# Patient Record
Sex: MALE | Race: WHITE | NOT HISPANIC OR LATINO | URBAN - METROPOLITAN AREA
[De-identification: names, ages, dates, MRNs, and addresses within clinical notes are randomized per-mention and may not be internally consistent; named-entity substitution may affect disease eponyms.]

---

## 2022-06-30 ENCOUNTER — EMERGENCY (EMERGENCY)
Facility: HOSPITAL | Age: 32
LOS: 1 days | Discharge: HOME | End: 2022-06-30
Attending: EMERGENCY MEDICINE
Payer: SELF-PAY

## 2022-06-30 VITALS
SYSTOLIC BLOOD PRESSURE: 119 MMHG | RESPIRATION RATE: 18 BRPM | TEMPERATURE: 98 F | WEIGHT: 149.91 LBS | HEART RATE: 86 BPM | DIASTOLIC BLOOD PRESSURE: 82 MMHG | OXYGEN SATURATION: 100 %

## 2022-06-30 PROCEDURE — 73610 X-RAY EXAM OF ANKLE: CPT

## 2022-06-30 PROCEDURE — 90715 TDAP VACCINE 7 YRS/> IM: CPT

## 2022-06-30 PROCEDURE — 73110 X-RAY EXAM OF WRIST: CPT

## 2022-06-30 PROCEDURE — 73090 X-RAY EXAM OF FOREARM: CPT

## 2022-06-30 PROCEDURE — 73630 X-RAY EXAM OF FOOT: CPT | Mod: 26,50

## 2022-06-30 PROCEDURE — 11042 DBRDMT SUBQ TIS 1ST 20SQCM/<: CPT

## 2022-06-30 PROCEDURE — 73630 X-RAY EXAM OF FOOT: CPT

## 2022-06-30 PROCEDURE — 73610 X-RAY EXAM OF ANKLE: CPT | Mod: 26,50

## 2022-06-30 PROCEDURE — 73110 X-RAY EXAM OF WRIST: CPT | Mod: 26,RT

## 2022-06-30 PROCEDURE — 99284 EMERGENCY DEPT VISIT MOD MDM: CPT | Mod: 25

## 2022-06-30 PROCEDURE — 99284 EMERGENCY DEPT VISIT MOD MDM: CPT

## 2022-06-30 PROCEDURE — 73090 X-RAY EXAM OF FOREARM: CPT | Mod: 26,LT

## 2022-06-30 RX ORDER — CEPHALEXIN 500 MG
1 CAPSULE ORAL
Qty: 28 | Refills: 0
Start: 2022-06-30 | End: 2022-07-06

## 2022-06-30 RX ORDER — TETANUS TOXOID, REDUCED DIPHTHERIA TOXOID AND ACELLULAR PERTUSSIS VACCINE, ADSORBED 5; 2.5; 8; 8; 2.5 [IU]/.5ML; [IU]/.5ML; UG/.5ML; UG/.5ML; UG/.5ML
0.5 SUSPENSION INTRAMUSCULAR ONCE
Refills: 0 | Status: COMPLETED | OUTPATIENT
Start: 2022-06-30 | End: 2022-06-30

## 2022-06-30 RX ORDER — ACETAMINOPHEN 500 MG
650 TABLET ORAL ONCE
Refills: 0 | Status: COMPLETED | OUTPATIENT
Start: 2022-06-30 | End: 2022-06-30

## 2022-06-30 RX ADMIN — TETANUS TOXOID, REDUCED DIPHTHERIA TOXOID AND ACELLULAR PERTUSSIS VACCINE, ADSORBED 0.5 MILLILITER(S): 5; 2.5; 8; 8; 2.5 SUSPENSION INTRAMUSCULAR at 20:23

## 2022-06-30 RX ADMIN — Medication 650 MILLIGRAM(S): at 18:51

## 2022-06-30 NOTE — ED PROVIDER NOTE - PHYSICAL EXAMINATION
open wound to the right lateral malleolus  ecchymosis over the 3rd-5th left toes  ttp over the left lateral malleolus  full ROM of ankles b/l  large abrasion to the right forearm on the dorsal surface  abrasion, scabs on the right hip  abrasion above the open wound on the right leg Gen: No acute distress, non toxic. Pt sitting comfortably in the bed, on his phone.  HEAD: NCAT. No scalp lacerations or hematomas.   EYES: Mucous membranes moist, pink conjunctivae, EOMI  EARS: TM's gray, intact with good light reflex b/l. TM's with no erythema, exudate, effusion or bulging b/l. No hemotympanum b/l. No postauricular ecchymosis b/l.  NOSE: Patent without congestion or epistaxis. No nasal flaring.   Throat: Patent, uvula midline. Posterior pharynx without blood, tonsillar swelling, erythema or exudate. Moist mucous membranes. No lesions or plaques in the mouth/buccal mucosa.  NECK: No cervical/submandibular lymphadenopathy. No neck stiffness. Neck supple. Full ROM of neck.   CV: RRR, nl s1/s2.   Resp: CTAB, normal rate and effort. No wheezes, rhonchi, or crackles. No nasal flaring, retractions, or accessory muscle use. No signs of respiratory distress. No ttp to chest wall. No sternal ttp. No ecchymosis or abrasions on chest.   GI: Abdomen soft, nondistended. +Bowel sounds. Nontender to palpation in all 4 quadrants. No rebound tenderness, no guarding. No ecchymosis on abdomen.   Neuro: A&O x4, moving all 4 extremities. see eyes and mouth/throat exams  CN V intact- facial sensation intact  CN VII intact- Face appears symmetric with normal tone at rest, with no facial droop. Pt able to raise eyebrows b/l, no loss of forehead wrinkles when raised b/l. Smile is symmetrical with no flattening of nasolabial fold. No tics or abnormal movements visualized on the face.   CN XI- SCM/trapezius strength intact and strong b/l.   CN XII- No dysarthria.  Normal muscle bulk and tone, 5/5 muscle strength on upper and lower extremities b/l. Pt ambulating with a limp. Sensation intact and symmetrical on face, upper and lower extremities b/l. No focal neuro deficits. Neurovascularly intact.   MSK: No mid-line spinal or paraspinal ttp. No palpable deformities. No snuffbox tenderness b/l. ttp over the dorsal surface of the right forearm surrounding the abrasions. No ttp over the distal radius and ulna b/l. No ttp over the digits, wrists, elbows, and shoulders b/l. Full ROM, passive and actively without pain, of shoulders, elbows, wrist and digits b/l.   ttp and swelling over the left lateral malleolus and anterior talofibular ligament area. ttp over the 5th and 2nd metatarsals. No ttp over the rest of the metatarsals or joints in the feet b/l. full ROM of ankles b/l. no ttp over the open wound on the right lateral malleolus. no ttp over the right ankle or joints of the foot. no ttp over the knees and hips b/l. full ROM of lower extremities b/l. No laxity of knee's b/l. No visualized or palpable deformities. No effusion or hemarthrosis. No ttp over proximal fibula.   Skin: Circular open wound to the right lateral malleolus with scabbing over parts of the wounds, with irregular devitalized edges. No evidence of necrosis. No surrounding erythema, warmth to touch, ttp surrounding area, streaking red from the wound. No evidence of cellulitic processes. Large abrasion superior to the open wound on the right leg, on the lateral side of the anterior shin. Multiple, large abrasion to the right forearm on the dorsal surface. No surrounding erythema, warmth touch, pus drainage. Small 2x2 abrasion with scabs on the right hip. No ttp, warmth to touch. No evidence of cellulitic processes. Ecchymosis over the 3rd-5th left toes, over the MTP joints. Skin well perfused. Warm and dry.  Vascular: Radial, ulnar, and dorsalis pedal pulses 2+ b/l

## 2022-06-30 NOTE — ED PROVIDER NOTE - PROGRESS NOTE DETAILS
Wound on the right lateral malleolus was debrided and with pressure irrigation. Small, black foreign bodies were removed. Wound care was provided. Xeroform was placed over the open wound. Pulses intact, cap refill <2sec. Neurovascularly intact. Will give podiatry f/u. Wound on the right lateral malleolus was explored, debrided and pressure irrigation was performed extensively. Multiple small, black foreign bodies were removed. Wound care was provided. Xeroform was placed over the open wound. Pulses intact, cap refill <2sec. Neurovascularly intact. Will give podiatry f/u. Abx prescribed.   Abrasions on the right forearm and lower leg were cleaned, wound care provided. Bacitracin placed over the abrasions.   Pt was instructed to keep the dressings dry for 24 hrs, then to clean daily with warm water and soap gently. Bacitracin was given. Strict return precautions were explained (including fever, swelling, pain, redness, pus drainage). Instructed to have a wound check in 48 hrs for wound on right lateral malleolus.    Crutches given, and aircast placed over left ankle. Ortho f/u given. Strict return precautions explained.
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
## 2022-06-30 NOTE — ED PROVIDER NOTE - NS ED ROS FT
Gen: denies fever, chills  Skin: +Abrasions, open wound, bruising. denies laceration  HEENT: denies visual changes, ear pain, nasal congestion, throat pain  Respiratory: denies SOB  Cardiovascular: denies chest pain  GI: denies abdominal pain, n/v  : denies hematuria, frequency, bowel/bladder incontinence  MSK: +left and right ankle pain. +right forearm pain. denies back pain, neck pain  Neuro: denies headache, dizziness, weakness, numbness Gen: denies fever, chills  Skin: +Abrasions to the right forearm, right lower leg, and on the right hip, open wound to the right ankle, bruising to the left foot. denies laceration  HEENT: denies visual changes, ear pain,  throat pain  Respiratory: denies SOB  Cardiovascular: denies chest pain  GI: denies abdominal pain, n/v  : denies hematuria, frequency, bowel/bladder incontinence  MSK: +left and right ankle pain. +right forearm pain. denies back pain, neck pain, hip pain, knee pain  Neuro: denies headache, dizziness, weakness, numbness

## 2022-06-30 NOTE — ED PROVIDER NOTE - NSFOLLOWUPINSTRUCTIONS_ED_ALL_ED_FT
- Follow up with your primary doctor within 1-2 days.   - Return to the ED for any new or worsening symptoms including but not limited to worsening redness, swelling, pain, pus drainage, fevers, etc.  - Keep areas clean and dry.  - May rinse with soap and water.   - Follow up with the podiatrist.  - Follow up with the orthopedist for the ankle pain.  - Take antibiotics as prescribed. Take antibiotics with food to prevent upset stomach.  - For pain, can take ibuprofen every 6 hours or acetaminophen (aka tylenol) every 4 hours.   - Can remove dressing on the right ankle in 2 days.

## 2022-06-30 NOTE — ED PROVIDER NOTE - CLINICAL SUMMARY MEDICAL DECISION MAKING FREE TEXT BOX
Wound care provided. Strict return precautions explained. Ortho f/u and podiatry f/u given. Extensive wound care provided. Strict return precautions explained. Ortho f/u and podiatry f/u given. Abx prescribed.

## 2022-06-30 NOTE — ED PROVIDER NOTE - PATIENT PORTAL LINK FT
You can access the FollowMyHealth Patient Portal offered by [facility] by registering at the following website: [email or web address]. By joining [employer]’s FollowMyHealth portal, you will also be able to view your health information using other applications (apps) compatible with our system.
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
## 2022-06-30 NOTE — ED PROVIDER NOTE - NS ED ATTENDING STATEMENT MOD
This was a shared visit with the RICKI. I reviewed and verified the documentation and independently performed the documented:

## 2022-06-30 NOTE — ED PROVIDER NOTE - CARE PROVIDER_API CALL
Maritza Franklin (DPM)  Podiatric Medicine and Surgery  [address] [place] [address].  [place] [address]  Phone: [phone]  Fax: [phone]  Follow Up Time:     Jona Seaman (DO)  Orthopaedic Surgery  [address] [facility] [facility] [address]  [place] [address]  Phone: [phone]  Fax: [phone]  Follow Up Time:

## 2022-06-30 NOTE — ED PROCEDURE NOTE - GENERAL PROCEDURE DETAILS
Pressure irrigation and wound debridement for wound on right lateral malleolus. Wound was explored and small, black foreign bodies were removed.

## 2024-08-24 ENCOUNTER — EMERGENCY (EMERGENCY)
Facility: HOSPITAL | Age: 34
LOS: 1 days | Discharge: HOME | End: 2024-08-24
Attending: STUDENT IN AN ORGANIZED HEALTH CARE EDUCATION/TRAINING PROGRAM
Payer: MEDICAID

## 2024-08-24 VITALS
OXYGEN SATURATION: 97 % | WEIGHT: 197.31 LBS | RESPIRATION RATE: 18 BRPM | DIASTOLIC BLOOD PRESSURE: 81 MMHG | TEMPERATURE: 98 F | HEART RATE: 93 BPM | SYSTOLIC BLOOD PRESSURE: 123 MMHG

## 2024-08-24 PROBLEM — Z78.9 OTHER SPECIFIED HEALTH STATUS: Chronic | Status: ACTIVE | Noted: 2022-06-30

## 2024-08-24 PROCEDURE — 73030 X-RAY EXAM OF SHOULDER: CPT | Mod: 26,LT

## 2024-08-24 PROCEDURE — 73030 X-RAY EXAM OF SHOULDER: CPT

## 2024-08-24 PROCEDURE — 23600 CLTX PROX HUMRL FX W/O MNPJ: CPT | Mod: 54,LT

## 2024-08-24 PROCEDURE — 99284 EMERGENCY DEPT VISIT MOD MDM: CPT | Mod: 57

## 2024-08-24 PROCEDURE — 99283 EMERGENCY DEPT VISIT LOW MDM: CPT

## 2024-09-16 PROBLEM — Z00.00 ENCOUNTER FOR PREVENTIVE HEALTH EXAMINATION: Status: ACTIVE | Noted: 2024-09-16

## 2024-09-19 ENCOUNTER — APPOINTMENT (OUTPATIENT)
Dept: ORTHOPEDIC SURGERY | Facility: CLINIC | Age: 34
End: 2024-09-19

## 2024-09-28 ENCOUNTER — NON-APPOINTMENT (OUTPATIENT)
Age: 34
End: 2024-09-28

## 2025-03-04 ENCOUNTER — NON-APPOINTMENT (OUTPATIENT)
Age: 35
End: 2025-03-04

## 2025-03-10 ENCOUNTER — EMERGENCY (EMERGENCY)
Facility: HOSPITAL | Age: 35
LOS: 1 days | Discharge: HOME | End: 2025-03-10
Attending: EMERGENCY MEDICINE
Payer: MEDICAID

## 2025-03-10 VITALS
DIASTOLIC BLOOD PRESSURE: 98 MMHG | TEMPERATURE: 98 F | OXYGEN SATURATION: 98 % | RESPIRATION RATE: 16 BRPM | SYSTOLIC BLOOD PRESSURE: 140 MMHG | HEART RATE: 82 BPM

## 2025-03-10 PROCEDURE — 29125 APPL SHORT ARM SPLINT STATIC: CPT | Mod: LT

## 2025-03-10 PROCEDURE — 99284 EMERGENCY DEPT VISIT MOD MDM: CPT | Mod: 57

## 2025-03-10 PROCEDURE — 73110 X-RAY EXAM OF WRIST: CPT | Mod: 26,LT

## 2025-03-10 PROCEDURE — 25500 CLTX RDL SHFT FX W/O MNPJ: CPT | Mod: 54,LT

## 2025-03-10 PROCEDURE — 99283 EMERGENCY DEPT VISIT LOW MDM: CPT | Mod: 25

## 2025-03-10 PROCEDURE — 73110 X-RAY EXAM OF WRIST: CPT
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
## 2025-03-10 NOTE — ED PROVIDER NOTE - PHYSICAL EXAMINATION
Generalt: Pt is well appearing. In no acute distress.   MSK:   Swelling and tenderness over the left medial wrist with snuffbox tenderness.  Full range of motion of fingers.  Limited range of motion secondary to pain of wrist  Skin: No rashes, abrasions or lacerations.  Neuro: Moves all extremities symmetrically. No motor or sensory deficits

## 2025-03-10 NOTE — ED PROVIDER NOTE - NSFOLLOWUPINSTRUCTIONS_ED_ALL_ED_FT
Please keep the cast on until you follow-up with the hand surgeon.  Please return to the ED for any worsening symptoms or concerns.  You may take Tylenol every 6 hours and Motrin every 8 hours as needed for pain control stays hydrated    Radial Fracture    A radial fracture is a break in the radius bone. The radius is a bone in the forearm, on the same side as the thumb. The forearm is the part of the arm that is between the elbow and the wrist. A radial fracture near the wrist (distal radialfracture) is the most common type of broken arm. A fracture can also occur near the elbow (radial head fracture).    What are the causes?  The most common cause of a radial fracture is falling with the arm outstretched. Other causes include:  An accident, such as a car or bike accident.  A hard, direct hit to the forearm.  What increases the risk?  You may be at greater risk for a radial fracture if you:  Are female.  Are an older adult.  Play contact sports.  Have a condition that causes your bones to become thin and brittle (osteoporosis).  What are the signs or symptoms?  A radial fracture causes pain immediately after the injury. Other signs and symptoms may include:  An abnormal bend or bump in the arm (deformity).  Swelling.  Tenderness.  Bruising.  Numbness or tingling in your arm and hand.  Limited movement of your arm and hand.  Pain when trying to move your wrist, hand, or elbow.  How is this diagnosed?  This condition may be diagnosed based on:  Your symptoms and medical history.  A physical exam.  An X-ray.  How is this treated?  Treatment depends on how severe your fracture is, where it is, and how the pieces of the broken bone line up with each other (alignment).  Initially, you may need to wear a temporary splint to stabilize the injury for a few days until your swelling goes down. After the swelling goes down, you may get a cast, get a different type of splint, or have surgery.  If your broken bone is not aligned (displaced) or significantly involves other joints (intra-articular fracture), your health care provider will need to align the bone pieces. To align your broken bone, your health care provider may:  Move the bones back into position without surgery (closed reduction).  Perform surgery to align the fracture and fix the bone pieces into place with metal screws, plates, or wires (open reduction and internal fixation).  Perform surgery to align the fracture and fix the bone pieces into place with pins that are attached to a stabilizing bar outside your skin (external fixation).  If there is a cut (laceration) in the skin over the fracture, this may indicate a compound fracture. You may need to take antibiotic medicines and have surgery to clean out the wound and prevent infection of the bones.  Treatment may also include:  Wearing a splint or cast. This keeps your wrist in place (immobilizes) and allows the fractured bone to heal properly.  Having your cast changed after 2–3 weeks.  Physical therapy exercises to improve movement and strength in your arm.  Follow-up visits and X-rays to make sure you are healing.  Follow these instructions at home:  If you have a removable splint:    Wear the splint as told by your health care provider. Remove it only as told by your health care provider.  Check the skin around the splint every day. Tell your health care provider about any concerns.  Loosen the splint if your fingers tingle, become numb, or turn cold and blue.  Keep the splint clean and dry.  If you have a nonremovable cast or splint:    Do not put pressure on any part of the cast or splint until it is fully hardened. This may take several hours.  Do not stick anything inside the cast or splint to scratch your skin. Doing that increases your risk of infection.  Check the skin around the cast or splint every day. Tell your health care provider about any concerns.  You may put lotion on dry skin around the edges of the cast or splint. Do not put lotion on the skin underneath the cast or splint.  Keep it clean and dry.  Bathing    Do not take baths, swim, or use a hot tub until your health care provider approves. Ask your health care provider if you may take showers. You may only be allowed to take sponge baths.  If your splint or cast is not waterproof:  Do not let it get wet.  Cover it with a watertight covering when you take a bath or a shower.  Managing pain, stiffness, and swelling    Bag of ice on a towel on the skin.   If directed, put ice on the painful area. To do this:  If you have a removable splint, remove it as told by your health care provider.  Put ice in a plastic bag.  Place a towel between your skin and the bag, or between your cast or splint and the bag.  Leave the ice on for 20 minutes, 2–3 times a day.  Remove the ice if your skin turns bright red. This is very important. If you cannot feel pain, heat, or cold, you have a greater risk of damage to the area.  Move your fingers often to reduce stiffness and swelling.  Raise (elevate) your arm above the level of your heart while you are sitting or lying down.  Activity    Do not lift anything with your injured arm.  Do not use the injured arm to support your body weight until your health care provider says that you can.  Ask your health care provider what activities are safe for you and what activities you should avoid while you heal.  Do exercises as told by your health care provider or physical therapist.  Driving    Ask your health care provider:  If the medicine prescribed to you requires you to avoid driving or using machinery.  When it is safe to drive if you have a splint or cast on your arm.  General instructions    Take over-the-counter and prescription medicines only as told by your health care provider.  If you were prescribed an antibiotic medicine, take it as told by your health care provider. Do not stop using the antibiotic even if you start to feel better.  Do not use any products that contain nicotine or tobacco. These products include cigarettes, chewing tobacco, and vaping devices, such as e-cigarettes. These can delay bone healing. If you need help quitting, ask your health care provider.  Keep all follow-up visits. This is important.  Contact a health care provider if you have:  Pain that does not get better with medicine.  Swelling that gets worse.  A bad smell coming from your cast.  Get help right away if:  You cannot move your fingers.  You have severe pain, especially if the pain changes significantly or suddenly.  Your fingers or your hand:  Become numb, cold, or pale.  Turn a bluish color.  Summary  A radial fracture is a break in the radius bone.  The most common cause is falling on an outstretched hand. Treatment depends on how severe your fracture is, where it is, and how the pieces of the broken bone line up with each other.  A splint or cast may be needed to help the fracture heal. A more severe fracture may require surgery.  This information is not intended to replace advice given to you by your health care provider. Make sure you discuss any questions you have with your health care provider.

## 2025-03-10 NOTE — ED PROVIDER NOTE - CLINICAL SUMMARY MEDICAL DECISION MAKING FREE TEXT BOX
34-year-old male patient presents to the ED with left wrist pain status post fall, noted to have a distal radius intra-articular fracture without displacement.   sugar-tong splint placed.  Patient neurovascularly intact.  No pain at this time.  Return precautions given, hand follow-up given.  Patient agrees to plan of care

## 2025-03-10 NOTE — ED PROVIDER NOTE - PATIENT PORTAL LINK FT
You can access the FollowMyHealth Patient Portal offered by [facility] by registering at the following website: [email or web address]. By joining [employer]’s FollowMyHealth portal, you will also be able to view your health information using other applications (apps) compatible with our system.

## 2025-03-10 NOTE — ED PROVIDER NOTE - OBJECTIVE STATEMENT
34-year-old male patient presents the ED complaining of left wrist pain.  Patient states that he tripped and fell yesterday, landing on his outstretched left hand.  Reports left wrist pain over his thumb area since then.  Denies any numbness or weakness.

## 2025-03-10 NOTE — ED PROCEDURE NOTE - ATTENDING CONTRIBUTION TO CARE
Dr. Barney, Attending Physician-  I performed a face to face bedside interview with patient regarding history of present illness, review of symptoms and past medical history. I completed an independent physical exam.  I have discussed patient's plan of care with the resident.

## 2025-03-10 NOTE — ED PROVIDER NOTE - CARE PROVIDER_API CALL
Tara Hummel  Orthopaedic Surgery  [address] [place] [address]  Phone: [phone]  Fax: [phone]  Follow Up Time: Urgent

## 2025-03-12 PROBLEM — M25.532 LEFT WRIST PAIN: Status: ACTIVE | Noted: 2025-03-12

## 2025-03-17 ENCOUNTER — APPOINTMENT (OUTPATIENT)
Dept: ORTHOPEDIC SURGERY | Facility: CLINIC | Age: 35
End: 2025-03-17
Payer: MEDICAID

## 2025-03-17 VITALS — BODY MASS INDEX: 29.03 KG/M2 | HEIGHT: 67 IN | WEIGHT: 185 LBS

## 2025-03-17 DIAGNOSIS — M25.532 PAIN IN LEFT WRIST: ICD-10-CM

## 2025-03-17 DIAGNOSIS — S52.502A UNSPECIFIED FRACTURE OF THE LOWER END OF LEFT RADIUS, INITIAL ENCOUNTER FOR CLOSED FRACTURE: ICD-10-CM

## 2025-03-17 PROCEDURE — 99204 OFFICE O/P NEW MOD 45 MIN: CPT

## 2025-03-17 PROCEDURE — 73110 X-RAY EXAM OF WRIST: CPT | Mod: 50

## 2025-03-20 ENCOUNTER — APPOINTMENT (OUTPATIENT)
Dept: UROLOGY | Facility: CLINIC | Age: 35
End: 2025-03-20